# Patient Record
Sex: FEMALE | Race: OTHER | Employment: FULL TIME | URBAN - METROPOLITAN AREA
[De-identification: names, ages, dates, MRNs, and addresses within clinical notes are randomized per-mention and may not be internally consistent; named-entity substitution may affect disease eponyms.]

---

## 2019-02-05 ENCOUNTER — OFFICE VISIT (OUTPATIENT)
Dept: FAMILY MEDICINE CLINIC | Facility: CLINIC | Age: 24
End: 2019-02-05
Payer: COMMERCIAL

## 2019-02-05 VITALS
DIASTOLIC BLOOD PRESSURE: 78 MMHG | WEIGHT: 152 LBS | HEIGHT: 66 IN | SYSTOLIC BLOOD PRESSURE: 120 MMHG | BODY MASS INDEX: 24.43 KG/M2 | HEART RATE: 75 BPM

## 2019-02-05 DIAGNOSIS — B36.0 TINEA VERSICOLOR: Primary | ICD-10-CM

## 2019-02-05 PROCEDURE — 99202 OFFICE O/P NEW SF 15 MIN: CPT | Performed by: NURSE PRACTITIONER

## 2019-02-05 RX ORDER — SELENIUM SULFIDE 2.5 MG/100ML
LOTION TOPICAL
Qty: 150 ML | Refills: 3 | Status: SHIPPED | OUTPATIENT
Start: 2019-02-05 | End: 2019-09-10

## 2019-02-06 NOTE — ASSESSMENT & PLAN NOTE
Selenium sulfide-apply to affected skin-leave in place for 10 min and then rinse; apply 3/week for 2 weeks and then once a week prn    Discussed with pt that new skin will need to grow in order to improve discoloration.      Please call if symptoms worsen o

## 2019-02-06 NOTE — PROGRESS NOTES
HPI  Pt presents for white spots on skin for past 2 years. First noticed 2 years ago after trip to [place]. Denies pain, itching. Seems to have spread on back and some spots on lower abdomen. Review of Systems   Skin: Positive for color change. Physical Exam   Nursing note and vitals reviewed. Constitutional: She is oriented to person, place, and time. She appears well-developed and well-nourished. No distress. Cardiovascular: Normal rate and regular rhythm.     Pulmonary/Chest: Effort normal.

## 2019-06-13 ENCOUNTER — HOSPITAL ENCOUNTER (OUTPATIENT)
Age: 24
Discharge: HOME | End: 2019-06-13
Attending: EMERGENCY MEDICINE
Payer: COMMERCIAL

## 2019-06-13 VITALS
HEART RATE: 68 BPM | BODY MASS INDEX: 19.29 KG/M2 | TEMPERATURE: 98 F | RESPIRATION RATE: 18 BRPM | DIASTOLIC BLOOD PRESSURE: 86 MMHG | SYSTOLIC BLOOD PRESSURE: 127 MMHG | WEIGHT: 120 LBS | OXYGEN SATURATION: 100 % | HEIGHT: 66 IN

## 2019-06-13 DIAGNOSIS — J06.9 UPPER RESPIRATORY TRACT INFECTION, UNSPECIFIED TYPE: Primary | ICD-10-CM

## 2019-06-13 PROCEDURE — 99212 OFFICE O/P EST SF 10 MIN: CPT

## 2019-06-13 PROCEDURE — 99202 OFFICE O/P NEW SF 15 MIN: CPT

## 2019-06-13 RX ORDER — AMOXICILLIN AND CLAVULANATE POTASSIUM 875; 125 MG/1; MG/1
1 TABLET, FILM COATED ORAL DAILY
COMMUNITY
End: 2019-12-03 | Stop reason: ALTCHOICE

## 2019-06-13 RX ORDER — AMOXICILLIN 875 MG/1
875 TABLET, COATED ORAL DAILY
COMMUNITY
End: 2019-06-13 | Stop reason: CLARIF

## 2019-06-13 NOTE — ED INITIAL ASSESSMENT (HPI)
Started with sore throat, fever, ha, cough x 6 days and put on meds while on vacation. Was put on amox 875mg while in the [place].  (she has all meds with her)  meds started on 5 days ago, feels slightly better.

## 2019-06-14 NOTE — ED PROVIDER NOTES
Patient Seen in: [facility] In [address] [place] [address]    History   Patient presents with:  Sore Throat  Cough/URI  Fever (infectious)  Headache (neurologic)    Stated Complaint: sore throat     HPI    31 yo female with one week of sore throat, co is warm and dry. Capillary refill takes less than 2 seconds. Psychiatric: She has a normal mood and affect. Her behavior is normal.   Nursing note and vitals reviewed.            ED Course   Labs Reviewed - No data to display           MDM   This is most

## 2019-07-01 ENCOUNTER — TELEPHONE (OUTPATIENT)
Dept: FAMILY MEDICINE CLINIC | Facility: CLINIC | Age: 24
End: 2019-07-01

## 2019-07-01 DIAGNOSIS — B36.0 TINEA VERSICOLOR: Primary | ICD-10-CM

## 2019-07-01 NOTE — TELEPHONE ENCOUNTER
Patient requesting a referral to see a dermatology. She seen a apn for a skin issue in February and not getting better. Please advise and sign off referral if you approve. Informed patient she may need to be seen. Patient verbally understood.

## 2019-09-10 ENCOUNTER — OFFICE VISIT (OUTPATIENT)
Dept: DERMATOLOGY CLINIC | Facility: CLINIC | Age: 24
End: 2019-09-10
Payer: COMMERCIAL

## 2019-09-10 DIAGNOSIS — L81.9 HYPOPIGMENTATION: Primary | ICD-10-CM

## 2019-09-10 PROCEDURE — 99202 OFFICE O/P NEW SF 15 MIN: CPT | Performed by: DERMATOLOGY

## 2019-09-10 RX ORDER — KETOCONAZOLE 200 MG/1
TABLET ORAL
Qty: 4 TABLET | Refills: 0 | Status: SHIPPED | OUTPATIENT
Start: 2019-09-10 | End: 2019-12-03 | Stop reason: ALTCHOICE

## 2019-09-10 RX ORDER — SELENIUM SULFIDE 2.5 MG/100ML
LOTION TOPICAL
Qty: 150 ML | Refills: 3 | Status: SHIPPED | OUTPATIENT
Start: 2019-09-10 | End: 2020-12-01 | Stop reason: ALTCHOICE

## 2019-09-10 NOTE — PROGRESS NOTES
HPI:     Chief Complaint     Derm Problem        HPI     Derm Problem      Additional comments: New pt. pt prsenting today with white spots to lower back for 6 months. pt has been using Selenium Sulfide 2.5% with slight improvement.           Last edited b education level: Not on file    Occupational History      Not on file    Social Needs      Financial resource strain: Not on file      Food insecurity:        Worry: Not on file        Inability: Not on file      Transportation needs:        Medical: Not o this juncture postinflammatory versus any residual tinea versicolor. I have told patient it is most likely postinflammatory changes which can take 6 months to year or longer to completely resolve.   However due to her concern I did give her 2 doses of keto

## 2019-09-13 ENCOUNTER — TELEPHONE (OUTPATIENT)
Dept: DERMATOLOGY CLINIC | Facility: CLINIC | Age: 24
End: 2019-09-13

## 2019-09-13 NOTE — TELEPHONE ENCOUNTER
Fax from [facility] [employer] placed in pa inbox    ketoconazole 200 MG Oral Tab has been- denied

## 2019-09-16 NOTE — TELEPHONE ENCOUNTER
I pended appeal letter, kindly review and send back if I may send.  Staff: this will need to be mailed to [address] and appeals department [phone]

## 2019-09-16 NOTE — TELEPHONE ENCOUNTER
Received denial from [facility] [employer] on pt's ketoconazole tabs stating the they are not FDA approved treatment for hypopigmentation. No alternative provided. Any recommendations?

## 2019-09-16 NOTE — TELEPHONE ENCOUNTER
The diagnosis that it is being prescribed for is tinea versicolor- incomplete response from topicals

## 2019-12-03 ENCOUNTER — OFFICE VISIT (OUTPATIENT)
Dept: FAMILY MEDICINE CLINIC | Facility: CLINIC | Age: 24
End: 2019-12-03
Payer: COMMERCIAL

## 2019-12-03 ENCOUNTER — LAB ENCOUNTER (OUTPATIENT)
Dept: LAB | Age: 24
End: 2019-12-03
Attending: FAMILY MEDICINE
Payer: COMMERCIAL

## 2019-12-03 VITALS
DIASTOLIC BLOOD PRESSURE: 60 MMHG | HEIGHT: 66 IN | WEIGHT: 153 LBS | SYSTOLIC BLOOD PRESSURE: 108 MMHG | BODY MASS INDEX: 24.59 KG/M2 | HEART RATE: 69 BPM

## 2019-12-03 DIAGNOSIS — Z13.21 ENCOUNTER FOR VITAMIN DEFICIENCY SCREENING: ICD-10-CM

## 2019-12-03 DIAGNOSIS — Z00.00 ROUTINE MEDICAL EXAM: ICD-10-CM

## 2019-12-03 DIAGNOSIS — B36.0 TINEA VERSICOLOR: ICD-10-CM

## 2019-12-03 DIAGNOSIS — Z00.00 ROUTINE MEDICAL EXAM: Primary | ICD-10-CM

## 2019-12-03 PROCEDURE — 85025 COMPLETE CBC W/AUTO DIFF WBC: CPT

## 2019-12-03 PROCEDURE — 84443 ASSAY THYROID STIM HORMONE: CPT

## 2019-12-03 PROCEDURE — 82306 VITAMIN D 25 HYDROXY: CPT

## 2019-12-03 PROCEDURE — 80061 LIPID PANEL: CPT

## 2019-12-03 PROCEDURE — 36415 COLL VENOUS BLD VENIPUNCTURE: CPT

## 2019-12-03 PROCEDURE — 82607 VITAMIN B-12: CPT

## 2019-12-03 PROCEDURE — 99395 PREV VISIT EST AGE 18-39: CPT | Performed by: FAMILY MEDICINE

## 2019-12-03 PROCEDURE — 80053 COMPREHEN METABOLIC PANEL: CPT

## 2019-12-03 RX ORDER — FLUCONAZOLE 150 MG/1
150 TABLET ORAL DAILY
Qty: 7 TABLET | Refills: 0 | Status: SHIPPED | OUTPATIENT
Start: 2019-12-03 | End: 2020-12-01 | Stop reason: ALTCHOICE

## 2019-12-03 NOTE — PROGRESS NOTES
HPI:   Jeffrey Leiva is a 30year old female who presents for a complete ;physical exam.    Reports last 2 weeks has pain in her lower back. Reports goes to the gym but no heavy lifting. Does not know why she has the pain.    Reports still has white spots o HEENT: atraumatic, normocephalic,ears and throat are clear  EYES:PERRLA, EOMI, normal optic disk,conjunctiva are clear  NECK: supple,no adenopathy,no bruits  CHEST: no chest tenderness  LUNGS: clear to auscultation  CARDIO: RRR without murmur  GI: good B

## 2019-12-05 NOTE — PROGRESS NOTES
[address] do have elevated cholesterol. Need to change diet and start exercising 4-5 times a week 30-45 minutes. . Decrease food portions. No fast foods - stick with fresh fruits/veggies, chicken and fish.  Can also take fish oil 1000 mg 1-2 tablets twice

## 2020-01-26 ENCOUNTER — PATIENT MESSAGE (OUTPATIENT)
Dept: FAMILY MEDICINE CLINIC | Facility: CLINIC | Age: 25
End: 2020-01-26

## 2020-01-27 ENCOUNTER — TELEPHONE (OUTPATIENT)
Dept: FAMILY MEDICINE CLINIC | Facility: CLINIC | Age: 25
End: 2020-01-27

## 2020-01-27 DIAGNOSIS — B36.0 TINEA VERSICOLOR: Primary | ICD-10-CM

## 2020-01-27 NOTE — TELEPHONE ENCOUNTER
Left message to call back.   [employer] message sent.        ----- Message from Joann Cordova sent at 1/26/2020  8:58 PM CST -----  Regarding: Visit Follow-up Question  Contact: [phone]  Hello,  I'm using Selenium Sulfide for one year, and had a break o

## 2020-01-27 NOTE — TELEPHONE ENCOUNTER
See TE acute 1/27/20. [employer] message sent. From: Viktoriya Clemens  To: Zhane Damon MD  Sent: 1/26/2020  8:58 PM CST  Subject: Visit Follow-up Question    Hello,  I'm using Selenium Sulfide for one year, and had a break only for two months.  At th

## 2020-01-27 NOTE — TELEPHONE ENCOUNTER
I recommend pt see another derm- will refer to Dr. Tamela Ortega. Maybe UV treatment.  I placed a referral

## 2020-01-27 NOTE — TELEPHONE ENCOUNTER
Patient states office visit on 12/3/19 with Dr. Corin Fabian for these \"white spots\" and continues to have them. States the dermatologist informed her to \"just keep doing what I've been doing. \"    Patient requesting further advice from Dr. Corin Fabian.   Please advis

## 2020-12-01 ENCOUNTER — OFFICE VISIT (OUTPATIENT)
Dept: FAMILY MEDICINE CLINIC | Facility: CLINIC | Age: 25
End: 2020-12-01
Payer: COMMERCIAL

## 2020-12-01 VITALS
DIASTOLIC BLOOD PRESSURE: 62 MMHG | HEIGHT: 66 IN | SYSTOLIC BLOOD PRESSURE: 95 MMHG | TEMPERATURE: 98 F | WEIGHT: 141 LBS | HEART RATE: 66 BPM | BODY MASS INDEX: 22.66 KG/M2

## 2020-12-01 DIAGNOSIS — R30.0 DYSURIA: Primary | ICD-10-CM

## 2020-12-01 PROCEDURE — 3074F SYST BP LT 130 MM HG: CPT | Performed by: FAMILY MEDICINE

## 2020-12-01 PROCEDURE — 3078F DIAST BP <80 MM HG: CPT | Performed by: FAMILY MEDICINE

## 2020-12-01 PROCEDURE — 99213 OFFICE O/P EST LOW 20 MIN: CPT | Performed by: FAMILY MEDICINE

## 2020-12-01 PROCEDURE — 81003 URINALYSIS AUTO W/O SCOPE: CPT | Performed by: FAMILY MEDICINE

## 2020-12-01 PROCEDURE — 3008F BODY MASS INDEX DOCD: CPT | Performed by: FAMILY MEDICINE

## 2020-12-01 RX ORDER — PHENAZOPYRIDINE HYDROCHLORIDE 200 MG/1
200 TABLET, FILM COATED ORAL 3 TIMES DAILY PRN
Qty: 9 TABLET | Refills: 0 | Status: SHIPPED | OUTPATIENT
Start: 2020-12-01

## 2020-12-01 NOTE — PROGRESS NOTES
Whitney Burton is a 30year old female. Patient presents with: Other: loose urine wtih pressure x 2 days    HPI:   Reports urinating more frequently this past 2 days and - reports more brown discharge with her menses. No blood in urine.  Reports urine is no

## 2021-01-09 ENCOUNTER — NURSE TRIAGE (OUTPATIENT)
Dept: FAMILY MEDICINE CLINIC | Facility: CLINIC | Age: 26
End: 2021-01-09

## 2021-01-09 ENCOUNTER — PATIENT MESSAGE (OUTPATIENT)
Dept: FAMILY MEDICINE CLINIC | Facility: CLINIC | Age: 26
End: 2021-01-09

## 2021-01-09 RX ORDER — VALACYCLOVIR HYDROCHLORIDE 1 G/1
1 TABLET, FILM COATED ORAL EVERY 12 HOURS SCHEDULED
Qty: 10 TABLET | Refills: 0 | Status: SHIPPED | OUTPATIENT
Start: 2021-01-09

## 2021-01-09 NOTE — TELEPHONE ENCOUNTER
From: Keely Guzman  To: Narendra Ríos MD  Sent: 1/9/2021 9:39 AM CST  Subject: Other    Hello,  I have cold sore. Can I get prescription for the medication?

## 2021-01-09 NOTE — TELEPHONE ENCOUNTER
Triage RN, please call patient. [employer] message was sent to patient. No diagnosis, no prior medication for this.    ----- Message from Clare Odom sent at 1/9/2021  9:39 AM CST -----  Regarding: Other  Contact: [phone]  Hello,  I have cold sore.

## 2021-01-09 NOTE — TELEPHONE ENCOUNTER
Message noted. May start valtrex as requested. Erx sent to listed pharmacy.  To follow up for appointment if not better; Please notify patient

## 2021-01-09 NOTE — TELEPHONE ENCOUNTER
Action Requested: Summary for Provider     []  Critical Lab, Recommendations Needed  [] Need Additional Advice  []   FYI    []   Need Orders  [x] Need Medications Sent to Pharmacy  []  Other     SUMMARY: Patient indicated that since this morning has a cold

## 2021-04-25 ENCOUNTER — IMMUNIZATION (OUTPATIENT)
Dept: LAB | Age: 26
End: 2021-04-25
Attending: HOSPITALIST
Payer: COMMERCIAL

## 2021-04-25 DIAGNOSIS — Z23 NEED FOR VACCINATION: Primary | ICD-10-CM

## 2021-04-25 PROCEDURE — 0001A SARSCOV2 VAC 30MCG/0.3ML IM: CPT

## 2021-06-05 ENCOUNTER — IMMUNIZATION (OUTPATIENT)
Dept: LAB | Facility: HOSPITAL | Age: 26
End: 2021-06-05
Attending: HOSPITALIST
Payer: COMMERCIAL

## 2021-06-05 DIAGNOSIS — Z23 NEED FOR VACCINATION: Primary | ICD-10-CM

## 2021-06-05 PROCEDURE — 0002A SARSCOV2 VAC 30MCG/0.3ML IM: CPT

## 2023-06-02 ENCOUNTER — HOSPITAL ENCOUNTER (OUTPATIENT)
Dept: LACTATION | Age: 28
Discharge: HOME | End: 2023-06-02

## 2023-06-02 PROCEDURE — 99205 OFFICE O/P NEW HI 60 MIN: CPT
